# Patient Record
Sex: MALE | Race: WHITE | NOT HISPANIC OR LATINO | Employment: OTHER | URBAN - NONMETROPOLITAN AREA
[De-identification: names, ages, dates, MRNs, and addresses within clinical notes are randomized per-mention and may not be internally consistent; named-entity substitution may affect disease eponyms.]

---

## 2022-04-21 ENCOUNTER — HOSPITAL ENCOUNTER (EMERGENCY)
Facility: HOSPITAL | Age: 42
Discharge: HOME | End: 2022-04-21
Attending: EMERGENCY MEDICINE | Admitting: EMERGENCY MEDICINE

## 2022-04-21 VITALS
OXYGEN SATURATION: 100 % | HEIGHT: 68 IN | BODY MASS INDEX: 31.37 KG/M2 | SYSTOLIC BLOOD PRESSURE: 144 MMHG | RESPIRATION RATE: 16 BRPM | WEIGHT: 207 LBS | DIASTOLIC BLOOD PRESSURE: 66 MMHG | HEART RATE: 54 BPM | TEMPERATURE: 98.4 F

## 2022-04-21 DIAGNOSIS — R11.2 INTRACTABLE NAUSEA AND VOMITING: Primary | ICD-10-CM

## 2022-04-21 LAB
ALBUMIN SERPL-MCNC: 4.2 G/DL (ref 3.5–5.2)
ALBUMIN/GLOB SERPL: 1.4 G/DL
ALP SERPL-CCNC: 77 U/L (ref 39–117)
ALT SERPL W P-5'-P-CCNC: 14 U/L (ref 1–41)
AMYLASE SERPL-CCNC: 54 U/L (ref 28–100)
ANION GAP SERPL CALCULATED.3IONS-SCNC: 12 MMOL/L (ref 5–15)
AST SERPL-CCNC: 15 U/L (ref 1–40)
BACTERIA UR QL AUTO: ABNORMAL /HPF
BASOPHILS # BLD AUTO: 0.05 10*3/MM3 (ref 0–0.2)
BASOPHILS NFR BLD AUTO: 0.4 % (ref 0–1.5)
BILIRUB SERPL-MCNC: 0.5 MG/DL (ref 0–1.2)
BILIRUB UR QL STRIP: NEGATIVE
BUN SERPL-MCNC: 17 MG/DL (ref 6–20)
BUN/CREAT SERPL: 17.5 (ref 7–25)
CALCIUM SPEC-SCNC: 9.1 MG/DL (ref 8.6–10.5)
CHLORIDE SERPL-SCNC: 99 MMOL/L (ref 98–107)
CLARITY UR: CLEAR
CO2 SERPL-SCNC: 22 MMOL/L (ref 22–29)
COLOR UR: ABNORMAL
CREAT SERPL-MCNC: 0.97 MG/DL (ref 0.76–1.27)
DEPRECATED RDW RBC AUTO: 43.8 FL (ref 37–54)
EGFRCR SERPLBLD CKD-EPI 2021: 101.2 ML/MIN/1.73
EOSINOPHIL # BLD AUTO: 0.01 10*3/MM3 (ref 0–0.4)
EOSINOPHIL NFR BLD AUTO: 0.1 % (ref 0.3–6.2)
ERYTHROCYTE [DISTWIDTH] IN BLOOD BY AUTOMATED COUNT: 13.4 % (ref 12.3–15.4)
GLOBULIN UR ELPH-MCNC: 3 GM/DL
GLUCOSE SERPL-MCNC: 87 MG/DL (ref 65–99)
GLUCOSE UR STRIP-MCNC: NEGATIVE MG/DL
HCT VFR BLD AUTO: 37.3 % (ref 37.5–51)
HGB BLD-MCNC: 13.2 G/DL (ref 13–17.7)
HGB UR QL STRIP.AUTO: ABNORMAL
HOLD SPECIMEN: NORMAL
HYALINE CASTS UR QL AUTO: ABNORMAL /LPF
IMM GRANULOCYTES # BLD AUTO: 0.07 10*3/MM3 (ref 0–0.05)
IMM GRANULOCYTES NFR BLD AUTO: 0.6 % (ref 0–0.5)
KETONES UR QL STRIP: ABNORMAL
LEUKOCYTE ESTERASE UR QL STRIP.AUTO: ABNORMAL
LIPASE SERPL-CCNC: 19 U/L (ref 13–60)
LYMPHOCYTES # BLD AUTO: 3.03 10*3/MM3 (ref 0.7–3.1)
LYMPHOCYTES NFR BLD AUTO: 24.3 % (ref 19.6–45.3)
MCH RBC QN AUTO: 31.7 PG (ref 26.6–33)
MCHC RBC AUTO-ENTMCNC: 35.4 G/DL (ref 31.5–35.7)
MCV RBC AUTO: 89.4 FL (ref 79–97)
MONOCYTES # BLD AUTO: 0.75 10*3/MM3 (ref 0.1–0.9)
MONOCYTES NFR BLD AUTO: 6 % (ref 5–12)
NEUTROPHILS NFR BLD AUTO: 68.6 % (ref 42.7–76)
NEUTROPHILS NFR BLD AUTO: 8.58 10*3/MM3 (ref 1.7–7)
NITRITE UR QL STRIP: NEGATIVE
NRBC BLD AUTO-RTO: 0 /100 WBC (ref 0–0.2)
PH UR STRIP.AUTO: 6.5 [PH] (ref 5–9)
PLATELET # BLD AUTO: 330 10*3/MM3 (ref 140–450)
PMV BLD AUTO: 9.8 FL (ref 6–12)
POTASSIUM SERPL-SCNC: 3.7 MMOL/L (ref 3.5–5.2)
PROT SERPL-MCNC: 7.2 G/DL (ref 6–8.5)
PROT UR QL STRIP: ABNORMAL
RBC # BLD AUTO: 4.17 10*6/MM3 (ref 4.14–5.8)
RBC # UR STRIP: ABNORMAL /HPF
REF LAB TEST METHOD: ABNORMAL
SODIUM SERPL-SCNC: 133 MMOL/L (ref 136–145)
SP GR UR STRIP: 1.03 (ref 1–1.03)
SQUAMOUS #/AREA URNS HPF: ABNORMAL /HPF
UROBILINOGEN UR QL STRIP: ABNORMAL
WBC # UR STRIP: ABNORMAL /HPF
WBC NRBC COR # BLD: 12.49 10*3/MM3 (ref 3.4–10.8)

## 2022-04-21 PROCEDURE — 83690 ASSAY OF LIPASE: CPT | Performed by: NURSE PRACTITIONER

## 2022-04-21 PROCEDURE — 96374 THER/PROPH/DIAG INJ IV PUSH: CPT

## 2022-04-21 PROCEDURE — 81001 URINALYSIS AUTO W/SCOPE: CPT | Performed by: NURSE PRACTITIONER

## 2022-04-21 PROCEDURE — 85025 COMPLETE CBC W/AUTO DIFF WBC: CPT | Performed by: NURSE PRACTITIONER

## 2022-04-21 PROCEDURE — 25010000002 METOCLOPRAMIDE PER 10 MG: Performed by: NURSE PRACTITIONER

## 2022-04-21 PROCEDURE — 99283 EMERGENCY DEPT VISIT LOW MDM: CPT

## 2022-04-21 PROCEDURE — 82150 ASSAY OF AMYLASE: CPT | Performed by: NURSE PRACTITIONER

## 2022-04-21 PROCEDURE — 80053 COMPREHEN METABOLIC PANEL: CPT | Performed by: NURSE PRACTITIONER

## 2022-04-21 RX ORDER — SODIUM CHLORIDE 9 MG/ML
INJECTION, SOLUTION INTRAVENOUS
Status: DISCONTINUED
Start: 2022-04-21 | End: 2022-04-22 | Stop reason: HOSPADM

## 2022-04-21 RX ORDER — METOCLOPRAMIDE HYDROCHLORIDE 5 MG/ML
10 INJECTION INTRAMUSCULAR; INTRAVENOUS ONCE
Status: COMPLETED | OUTPATIENT
Start: 2022-04-21 | End: 2022-04-21

## 2022-04-21 RX ORDER — METOCLOPRAMIDE 10 MG/1
10 TABLET ORAL ONCE
Status: COMPLETED | OUTPATIENT
Start: 2022-04-21 | End: 2022-04-21

## 2022-04-21 RX ORDER — SODIUM CHLORIDE 0.9 % (FLUSH) 0.9 %
10 SYRINGE (ML) INJECTION AS NEEDED
Status: DISCONTINUED | OUTPATIENT
Start: 2022-04-21 | End: 2022-04-22 | Stop reason: HOSPADM

## 2022-04-21 RX ORDER — METOCLOPRAMIDE HYDROCHLORIDE 5 MG/5ML
10 SOLUTION ORAL ONCE
Status: DISCONTINUED | OUTPATIENT
Start: 2022-04-21 | End: 2022-04-21

## 2022-04-21 RX ORDER — METOCLOPRAMIDE 10 MG/1
10 TABLET ORAL 3 TIMES DAILY
Qty: 15 TABLET | Refills: 0 | Status: SHIPPED | OUTPATIENT
Start: 2022-04-21 | End: 2022-04-26

## 2022-04-21 RX ADMIN — METOCLOPRAMIDE 10 MG: 5 INJECTION, SOLUTION INTRAMUSCULAR; INTRAVENOUS at 20:38

## 2022-04-21 RX ADMIN — SODIUM CHLORIDE 1000 ML: 9 INJECTION, SOLUTION INTRAVENOUS at 19:00

## 2022-04-21 RX ADMIN — METOCLOPRAMIDE 10 MG: 10 TABLET ORAL at 22:19

## 2022-04-21 NOTE — ED NOTES
Pt reports he has abd pain and n/v for past week, has been to urgent care twice. Unable to keep food or liquid down.

## 2022-04-22 ENCOUNTER — OFFICE VISIT (OUTPATIENT)
Dept: GASTROENTEROLOGY | Facility: CLINIC | Age: 42
End: 2022-04-22

## 2022-04-22 VITALS
WEIGHT: 205.8 LBS | SYSTOLIC BLOOD PRESSURE: 128 MMHG | HEART RATE: 61 BPM | DIASTOLIC BLOOD PRESSURE: 79 MMHG | BODY MASS INDEX: 31.19 KG/M2 | HEIGHT: 68 IN

## 2022-04-22 DIAGNOSIS — R11.0 NAUSEA: ICD-10-CM

## 2022-04-22 DIAGNOSIS — R10.10 PAIN OF UPPER ABDOMEN: Primary | ICD-10-CM

## 2022-04-22 DIAGNOSIS — R10.32 LEFT LOWER QUADRANT ABDOMINAL PAIN: ICD-10-CM

## 2022-04-22 PROCEDURE — 99204 OFFICE O/P NEW MOD 45 MIN: CPT | Performed by: INTERNAL MEDICINE

## 2022-04-22 RX ORDER — ONDANSETRON 4 MG/1
4 TABLET, ORALLY DISINTEGRATING ORAL EVERY 8 HOURS PRN
COMMUNITY
Start: 2022-04-21

## 2022-04-22 RX ORDER — OMEPRAZOLE 20 MG/1
20 TABLET, DELAYED RELEASE ORAL 2 TIMES DAILY
Qty: 168 TABLET | Refills: 4 | Status: SHIPPED | OUTPATIENT
Start: 2022-04-22 | End: 2022-04-25

## 2022-04-22 RX ORDER — DEXTROSE AND SODIUM CHLORIDE 5; .45 G/100ML; G/100ML
30 INJECTION, SOLUTION INTRAVENOUS CONTINUOUS PRN
Status: CANCELLED | OUTPATIENT
Start: 2022-04-26

## 2022-04-22 RX ORDER — SODIUM PICOSULFATE, MAGNESIUM OXIDE, AND ANHYDROUS CITRIC ACID 10; 3.5; 12 MG/160ML; G/160ML; G/160ML
160 LIQUID ORAL SEE ADMIN INSTRUCTIONS
Qty: 320 ML | Refills: 0 | Status: SHIPPED | OUTPATIENT
Start: 2022-04-22 | End: 2022-04-25

## 2022-04-22 NOTE — ED PROVIDER NOTES
"Subjective   Patient in the emergency department complaining of abdominal pain nausea vomiting.  He reports onset 1 week ago.  Significant other at bedside.  Patient's been to another emergency department 3 times in urgent care twice for intractable nausea and vomiting.  CT last night at [facility] was read as normal.  Patient reports in the past 24 hours has had approximately 40 bouts of vomiting.  He reports about 2 weeks ago he stopped using alcohol, nicotine, marijuana, sodas, \"cold turkey\".      History provided by:  Patient   used: No        Review of Systems   Constitutional: Negative for diaphoresis.   HENT: Negative for congestion.    Respiratory: Negative for shortness of breath.    Cardiovascular: Negative for chest pain and palpitations.   Gastrointestinal: Positive for abdominal pain, nausea and vomiting. Negative for diarrhea.   Genitourinary: Negative for flank pain.   Musculoskeletal: Negative for gait problem.   Skin: Negative for wound.   Allergic/Immunologic: Negative for immunocompromised state.   Neurological: Negative for weakness.   Hematological: Negative for adenopathy.   Psychiatric/Behavioral: Negative for confusion.   All other systems reviewed and are negative.      History reviewed. No pertinent past medical history.    Allergies   Allergen Reactions   • Penicillins Seizure       History reviewed. No pertinent surgical history.    History reviewed. No pertinent family history.    Social History     Socioeconomic History   • Marital status: Single   Tobacco Use   • Smoking status: Former Smoker   • Smokeless tobacco: Never Used   Vaping Use   • Vaping Use: Former   Substance and Sexual Activity   • Drug use: Not Currently   • Sexual activity: Defer           Objective   Physical Exam  Vitals and nursing note reviewed.   Constitutional:       Appearance: He is well-developed.   HENT:      Head: Normocephalic.      Nose: Nose normal.   Eyes:      " Conjunctiva/sclera: Conjunctivae normal.      Pupils: Pupils are equal, round, and reactive to light.   Cardiovascular:      Rate and Rhythm: Normal rate and regular rhythm.      Heart sounds: Normal heart sounds.   Pulmonary:      Effort: Pulmonary effort is normal.      Breath sounds: Normal breath sounds.   Abdominal:      Palpations: Abdomen is soft.      Tenderness: There is generalized abdominal tenderness.   Musculoskeletal:         General: Normal range of motion.      Cervical back: Normal range of motion.   Skin:     General: Skin is warm and dry.   Neurological:      Mental Status: He is alert and oriented to person, place, and time.      GCS: GCS eye subscore is 4. GCS verbal subscore is 5. GCS motor subscore is 6.         Procedures           ED Course      Results for orders placed or performed during the hospital encounter of 04/21/22   Comprehensive Metabolic Panel    Specimen: Blood   Result Value Ref Range    Glucose 87 65 - 99 mg/dL    BUN 17 6 - 20 mg/dL    Creatinine 0.97 0.76 - 1.27 mg/dL    Sodium 133 (L) 136 - 145 mmol/L    Potassium 3.7 3.5 - 5.2 mmol/L    Chloride 99 98 - 107 mmol/L    CO2 22.0 22.0 - 29.0 mmol/L    Calcium 9.1 8.6 - 10.5 mg/dL    Total Protein 7.2 6.0 - 8.5 g/dL    Albumin 4.20 3.50 - 5.20 g/dL    ALT (SGPT) 14 1 - 41 U/L    AST (SGOT) 15 1 - 40 U/L    Alkaline Phosphatase 77 39 - 117 U/L    Total Bilirubin 0.5 0.0 - 1.2 mg/dL    Globulin 3.0 gm/dL    A/G Ratio 1.4 g/dL    BUN/Creatinine Ratio 17.5 7.0 - 25.0    Anion Gap 12.0 5.0 - 15.0 mmol/L    eGFR 101.2 >60.0 mL/min/1.73   Lipase    Specimen: Blood   Result Value Ref Range    Lipase 19 13 - 60 U/L   Amylase    Specimen: Blood   Result Value Ref Range    Amylase 54 28 - 100 U/L   Urinalysis With Microscopic If Indicated (No Culture) - Urine, Clean Catch    Specimen: Urine, Clean Catch   Result Value Ref Range    Color, UA Dark Yellow Yellow, Straw, Dark Yellow, Anila    Appearance, UA Clear Clear    pH, UA 6.5 5.0 -  9.0    Specific Gravity, UA 1.030 1.003 - 1.030    Glucose, UA Negative Negative    Ketones, UA >=160 mg/dL (4+) (A) Negative    Bilirubin, UA Negative Negative    Blood, UA Trace (A) Negative    Protein, UA 30 mg/dL (1+) (A) Negative    Leuk Esterase, UA Trace (A) Negative    Nitrite, UA Negative Negative    Urobilinogen, UA 2.0 E.U./dL (A) 0.2 - 1.0 E.U./dL   CBC Auto Differential    Specimen: Blood   Result Value Ref Range    WBC 12.49 (H) 3.40 - 10.80 10*3/mm3    RBC 4.17 4.14 - 5.80 10*6/mm3    Hemoglobin 13.2 13.0 - 17.7 g/dL    Hematocrit 37.3 (L) 37.5 - 51.0 %    MCV 89.4 79.0 - 97.0 fL    MCH 31.7 26.6 - 33.0 pg    MCHC 35.4 31.5 - 35.7 g/dL    RDW 13.4 12.3 - 15.4 %    RDW-SD 43.8 37.0 - 54.0 fl    MPV 9.8 6.0 - 12.0 fL    Platelets 330 140 - 450 10*3/mm3    Neutrophil % 68.6 42.7 - 76.0 %    Lymphocyte % 24.3 19.6 - 45.3 %    Monocyte % 6.0 5.0 - 12.0 %    Eosinophil % 0.1 (L) 0.3 - 6.2 %    Basophil % 0.4 0.0 - 1.5 %    Immature Grans % 0.6 (H) 0.0 - 0.5 %    Neutrophils, Absolute 8.58 (H) 1.70 - 7.00 10*3/mm3    Lymphocytes, Absolute 3.03 0.70 - 3.10 10*3/mm3    Monocytes, Absolute 0.75 0.10 - 0.90 10*3/mm3    Eosinophils, Absolute 0.01 0.00 - 0.40 10*3/mm3    Basophils, Absolute 0.05 0.00 - 0.20 10*3/mm3    Immature Grans, Absolute 0.07 (H) 0.00 - 0.05 10*3/mm3    nRBC 0.0 0.0 - 0.2 /100 WBC   Urinalysis, Microscopic Only - Urine, Clean Catch    Specimen: Urine, Clean Catch   Result Value Ref Range    RBC, UA 3-5 (A) None Seen /HPF    WBC, UA 0-2 None Seen, 0-2, 3-5 /HPF    Bacteria, UA None Seen None Seen /HPF    Squamous Epithelial Cells, UA 0-2 None Seen, 0-2 /HPF    Hyaline Casts, UA 0-2 None Seen /LPF    Methodology Automated Microscopy    Gold Top - SST   Result Value Ref Range    Extra Tube Hold for add-ons.                                                 MDM    Final diagnoses:   Intractable nausea and vomiting       ED Disposition  ED Disposition     ED Disposition   Discharge    Condition    Stable    Comment   --             Holly Girard MD  [address] [place] [address] [address]  [place] [address] [phone]    Call in 1 day           Medication List      New Prescriptions    metoclopramide 10 MG tablet  Commonly known as: REGLAN  Take 1 tablet by mouth 3 (Three) Times a Day for 5 days.           Where to Get Your Medications      These medications were sent to [place] [id number] - [phone]  - [phone] [address] [address]    Phone: [phone]   · metoclopramide 10 MG tablet          Faisal Fonseca, APRN  04/21/22 2124

## 2022-04-25 ENCOUNTER — LAB (OUTPATIENT)
Dept: LAB | Facility: HOSPITAL | Age: 42
End: 2022-04-25

## 2022-04-25 DIAGNOSIS — R11.0 NAUSEA: ICD-10-CM

## 2022-04-25 DIAGNOSIS — R10.10 PAIN OF UPPER ABDOMEN: ICD-10-CM

## 2022-04-25 DIAGNOSIS — R10.32 LEFT LOWER QUADRANT ABDOMINAL PAIN: ICD-10-CM

## 2022-04-25 LAB — SARS-COV-2 N GENE RESP QL NAA+PROBE: NOT DETECTED

## 2022-04-25 PROCEDURE — C9803 HOPD COVID-19 SPEC COLLECT: HCPCS

## 2022-04-25 PROCEDURE — 87635 SARS-COV-2 COVID-19 AMP PRB: CPT

## 2022-04-26 ENCOUNTER — ANESTHESIA (OUTPATIENT)
Dept: GASTROENTEROLOGY | Facility: HOSPITAL | Age: 42
End: 2022-04-26

## 2022-04-26 ENCOUNTER — ANESTHESIA EVENT (OUTPATIENT)
Dept: GASTROENTEROLOGY | Facility: HOSPITAL | Age: 42
End: 2022-04-26

## 2022-04-26 ENCOUNTER — HOSPITAL ENCOUNTER (OUTPATIENT)
Facility: HOSPITAL | Age: 42
Setting detail: HOSPITAL OUTPATIENT SURGERY
Discharge: HOME | End: 2022-04-26
Attending: INTERNAL MEDICINE | Admitting: INTERNAL MEDICINE

## 2022-04-26 VITALS
SYSTOLIC BLOOD PRESSURE: 110 MMHG | OXYGEN SATURATION: 96 % | BODY MASS INDEX: 31.13 KG/M2 | HEIGHT: 68 IN | WEIGHT: 205.4 LBS | DIASTOLIC BLOOD PRESSURE: 64 MMHG | RESPIRATION RATE: 18 BRPM | TEMPERATURE: 97.1 F | HEART RATE: 72 BPM

## 2022-04-26 DIAGNOSIS — R10.32 LEFT LOWER QUADRANT ABDOMINAL PAIN: ICD-10-CM

## 2022-04-26 DIAGNOSIS — R10.10 PAIN OF UPPER ABDOMEN: ICD-10-CM

## 2022-04-26 DIAGNOSIS — R11.0 NAUSEA: ICD-10-CM

## 2022-04-26 PROCEDURE — 43239 EGD BIOPSY SINGLE/MULTIPLE: CPT | Performed by: INTERNAL MEDICINE

## 2022-04-26 PROCEDURE — 25010000002 PROPOFOL 10 MG/ML EMULSION: Performed by: NURSE ANESTHETIST, CERTIFIED REGISTERED

## 2022-04-26 RX ORDER — ONDANSETRON 2 MG/ML
4 INJECTION INTRAMUSCULAR; INTRAVENOUS ONCE AS NEEDED
Status: DISCONTINUED | OUTPATIENT
Start: 2022-04-26 | End: 2022-04-26 | Stop reason: HOSPADM

## 2022-04-26 RX ORDER — DEXTROSE AND SODIUM CHLORIDE 5; .45 G/100ML; G/100ML
30 INJECTION, SOLUTION INTRAVENOUS CONTINUOUS PRN
Status: DISCONTINUED | OUTPATIENT
Start: 2022-04-26 | End: 2022-04-26 | Stop reason: HOSPADM

## 2022-04-26 RX ORDER — PROPOFOL 10 MG/ML
VIAL (ML) INTRAVENOUS AS NEEDED
Status: DISCONTINUED | OUTPATIENT
Start: 2022-04-26 | End: 2022-04-26 | Stop reason: SURG

## 2022-04-26 RX ORDER — LIDOCAINE HYDROCHLORIDE 20 MG/ML
INJECTION, SOLUTION EPIDURAL; INFILTRATION; INTRACAUDAL; PERINEURAL AS NEEDED
Status: DISCONTINUED | OUTPATIENT
Start: 2022-04-26 | End: 2022-04-26 | Stop reason: SURG

## 2022-04-26 RX ADMIN — PROPOFOL 50 MG: 10 INJECTION, EMULSION INTRAVENOUS at 13:43

## 2022-04-26 RX ADMIN — PROPOFOL 30 MG: 10 INJECTION, EMULSION INTRAVENOUS at 13:45

## 2022-04-26 RX ADMIN — DEXTROSE AND SODIUM CHLORIDE 30 ML/HR: 5; 450 INJECTION, SOLUTION INTRAVENOUS at 13:17

## 2022-04-26 RX ADMIN — PROPOFOL 120 MG: 10 INJECTION, EMULSION INTRAVENOUS at 13:41

## 2022-04-26 RX ADMIN — LIDOCAINE HYDROCHLORIDE 100 MG: 20 INJECTION, SOLUTION EPIDURAL; INFILTRATION; INTRACAUDAL; PERINEURAL at 13:41

## 2022-04-26 NOTE — ANESTHESIA PREPROCEDURE EVALUATION
Anesthesia Evaluation     Patient summary reviewed and Nursing notes reviewed   no history of anesthetic complications:  NPO Solid Status: > 8 hours  NPO Liquid Status: > 8 hours           Airway   Mallampati: II  TM distance: >3 FB  Neck ROM: full  No difficulty expected  Dental    (+) poor dentition        Pulmonary    (+) a smoker Current,   Cardiovascular - negative cardio ROS        Neuro/Psych- negative ROS  GI/Hepatic/Renal/Endo    (+) obesity,       Musculoskeletal (-) negative ROS    Abdominal    Substance History   (+) drug use (THC)     OB/GYN          Other - negative ROS                       Anesthesia Plan    ASA 2     MAC     intravenous induction     Anesthetic plan, all risks, benefits, and alternatives have been provided, discussed and informed consent has been obtained with: patient.        CODE STATUS:

## 2022-04-26 NOTE — ANESTHESIA POSTPROCEDURE EVALUATION
Patient: Aron Grier    Procedure Summary     Date: 04/26/22 Room / Location: [facility] ENDOSCOPY 3 / [facility] ENDOSCOPY    Anesthesia Start: 1335 Anesthesia Stop: 1348    Procedure: ESOPHAGOGASTRODUODENOSCOPY (N/A ) Diagnosis:       Pain of upper abdomen      Left lower quadrant abdominal pain      Nausea      (Pain of upper abdomen [R10.10])      (Left lower quadrant abdominal pain [R10.32])      (Nausea [R11.0])    Surgeons: Holly Girard MD Provider: Olinda Davis CRNA    Anesthesia Type: MAC ASA Status: 2          Anesthesia Type: MAC    Vitals  No vitals data found for the desired time range.          Post Anesthesia Care and Evaluation    Patient location during evaluation: PHASE II  Patient participation: complete - patient participated  Level of consciousness: sleepy but conscious  Pain score: 0  Pain management: adequate  Airway patency: patent  Anesthetic complications: No anesthetic complications  PONV Status: none  Cardiovascular status: acceptable and hemodynamically stable  Respiratory status: acceptable  Hydration status: acceptable

## 2022-04-29 LAB
LAB AP CASE REPORT: NORMAL
PATH REPORT.FINAL DX SPEC: NORMAL

## 2022-05-06 ENCOUNTER — OFFICE VISIT (OUTPATIENT)
Dept: GASTROENTEROLOGY | Facility: CLINIC | Age: 42
End: 2022-05-06

## 2022-05-06 VITALS
SYSTOLIC BLOOD PRESSURE: 144 MMHG | HEART RATE: 75 BPM | DIASTOLIC BLOOD PRESSURE: 90 MMHG | BODY MASS INDEX: 31.64 KG/M2 | HEIGHT: 68 IN | WEIGHT: 208.8 LBS

## 2022-05-06 DIAGNOSIS — R10.10 PAIN OF UPPER ABDOMEN: Primary | ICD-10-CM

## 2022-05-06 PROCEDURE — 99213 OFFICE O/P EST LOW 20 MIN: CPT | Performed by: INTERNAL MEDICINE

## 2022-05-06 RX ORDER — OMEPRAZOLE 40 MG/1
40 CAPSULE, DELAYED RELEASE ORAL DAILY
Qty: 30 CAPSULE | Refills: 11 | Status: SHIPPED | OUTPATIENT
Start: 2022-05-06

## 2022-05-06 NOTE — PROGRESS NOTES
Chief Complaint   Patient presents with   • endo f/u       Subjective    Aron Grier is a 42 y.o. male.    History of Present Illness  Patient presented to GI clinic for follow-up visit today.  Feels better currently.  No further abdominal pain or nausea.  He has intermittent cough.  Denied heartburn.  Bowel movements regular.  Weight is stable.  EGD was consistent with hiatal hernia, esophagitis, gastritis and duodenitis.  Path was consistent with chronic esophagitis and reactive gastropathy.       The following portions of the patient's history were reviewed and updated as appropriate:   History reviewed. No pertinent past medical history.  Past Surgical History:   Procedure Laterality Date   • ENDOSCOPY  04/26/2022    Dr. Girard   • TENDON REPAIR      left ring finger.   done under local     Family History   Problem Relation Age of Onset   • Heart disease Mother    • Kidney disease Mother        Prior to Admission medications    Medication Sig Start Date End Date Taking? Authorizing Provider   omeprazole (priLOSEC) 40 MG capsule Take 1 capsule by mouth Daily. 5/6/22   Holly Girard MD   ondansetron ODT (ZOFRAN-ODT) 4 MG disintegrating tablet Place 4 mg under the tongue Every 8 (Eight) Hours As Needed. 4/21/22   ProviderArun MD     Allergies   Allergen Reactions   • Penicillins Seizure     Social History     Socioeconomic History   • Marital status: Legally    Tobacco Use   • Smoking status: Former Smoker   • Smokeless tobacco: Never Used   Vaping Use   • Vaping Use: Former   Substance and Sexual Activity   • Alcohol use: Yes     Comment: socially   • Drug use: Yes     Types: Marijuana   • Sexual activity: Defer       Review of Systems  Review of Systems   Constitutional: Negative for chills, fatigue, fever and unexpected weight change.   HENT: Negative for congestion, ear discharge, hearing loss, nosebleeds and sore throat.    Eyes: Negative for pain, discharge and redness.  "  Respiratory: Positive for cough. Negative for chest tightness, shortness of breath and wheezing.    Cardiovascular: Negative for chest pain and palpitations.   Gastrointestinal: Negative for abdominal distention, abdominal pain, blood in stool, constipation, diarrhea, nausea and vomiting.   Endocrine: Negative for cold intolerance, polydipsia, polyphagia and polyuria.   Genitourinary: Negative for dysuria, flank pain, frequency, hematuria and urgency.   Musculoskeletal: Negative for arthralgias, back pain, joint swelling and myalgias.   Skin: Negative for color change, pallor and rash.   Neurological: Negative for tremors, seizures, syncope, weakness and headaches.   Hematological: Negative for adenopathy. Does not bruise/bleed easily.   Psychiatric/Behavioral: Negative for behavioral problems, confusion, dysphoric mood, hallucinations and suicidal ideas. The patient is not nervous/anxious.         /90 (BP Location: Right arm)   Pulse 75   Ht 172.7 cm (68\")   Wt 94.7 kg (208 lb 12.8 oz)   BMI 31.75 kg/m²     Objective    Physical Exam  Constitutional:       Appearance: He is well-developed.   HENT:      Head: Normocephalic and atraumatic.   Eyes:      Conjunctiva/sclera: Conjunctivae normal.      Pupils: Pupils are equal, round, and reactive to light.   Neck:      Thyroid: No thyromegaly.   Cardiovascular:      Rate and Rhythm: Normal rate and regular rhythm.      Heart sounds: Normal heart sounds. No murmur heard.  Pulmonary:      Effort: Pulmonary effort is normal.      Breath sounds: Normal breath sounds. No wheezing.   Abdominal:      General: Bowel sounds are normal. There is no distension.      Palpations: Abdomen is soft. There is no mass.      Tenderness: There is no abdominal tenderness.      Hernia: No hernia is present.   Genitourinary:     Comments: No lesions noted  Musculoskeletal:         General: No tenderness. Normal range of motion.      Cervical back: Normal range of motion and neck " supple.   Lymphadenopathy:      Cervical: No cervical adenopathy.   Skin:     General: Skin is warm and dry.      Findings: No rash.   Neurological:      Mental Status: He is alert and oriented to person, place, and time.      Cranial Nerves: No cranial nerve deficit.   Psychiatric:         Thought Content: Thought content normal.       Admission on 04/26/2022, Discharged on 04/26/2022   Component Date Value Ref Range Status   • Case Report 04/26/2022    Final                    Value:Surgical Pathology Report                         Case: [id number]                                  Authorizing Provider:  Holly Girard MD      Collected:           04/26/2022 01:46 PM          Ordering Location:     [facility]   Received:            04/27/2022 07:41 AM                                 [facility]                                                     Pathologist:           Shashi Asencio MD                                                           Specimens:   1) - Gastric, Antrum                                                                                2) - Small Intestine, Duodenum                                                                      3) - Esophagus, EGJ                                                                       • Final Diagnosis 04/26/2022    Final                    Value:This result contains rich text formatting which cannot be displayed here.     Assessment/Plan    No diagnosis found..   1.  Abdominal pain and nausea, improved.  Likely due to reactive gastropathy.  Continue Prilosec.  Add Carafate if recurs.  2.  Cough, likely due to GERD.  Continue PPI.  Add Pepcid if continues.  Add antireflux lifestyle.  3.  Obesity, recommend exercise and diet control.  4.  Marijuana usage, recommend cessation.    Orders placed during this encounter include:  No orders of the defined types were placed in this encounter.      * Surgery not found *    Review and/or  summary of lab tests, radiology, procedures, medications. Review and summary of old records and obtaining of history. The risks and benefits of my recommendations, as well as other treatment options were discussed with the patient today. Questions were answered.    New Medications Ordered This Visit   Medications   • omeprazole (priLOSEC) 40 MG capsule     Sig: Take 1 capsule by mouth Daily.     Dispense:  30 capsule     Refill:  11       Follow-up: Return in about 3 months (around 8/6/2022).               Results for orders placed or performed during the hospital encounter of 04/26/22   Tissue Pathology Exam    Specimen: A: Gastric, Antrum; Tissue    B: Small Intestine, Duodenum; Tissue    C: Esophagus; Tissue   Result Value Ref Range    Case Report       Surgical Pathology Report                         Case: [id number]                                  Authorizing Provider:  Holly Girard MD      Collected:           04/26/2022 01:46 PM          Ordering Location:     [facility]   Received:            04/27/2022 07:41 AM                                 [facility]                                                     Pathologist:           Shashi Asencio MD                                                           Specimens:   1) - Gastric, Antrum                                                                                2) - Small Intestine, Duodenum                                                                      3) - Esophagus, EGJ                                                                        Final Diagnosis       SEE SCANNED REPORT       Results for orders placed or performed in visit on 04/25/22   COVID-19, [facility] IN-HOUSE, NP SWAB IN TRANSPORT MEDIA 8-10 HR TAT - Swab, Nasopharynx    Specimen: Nasopharynx; Swab   Result Value Ref Range    COVID19 Not Detected Not Detected - Ref. Range   Results for orders placed or performed during the hospital encounter of  04/21/22   [facility]   Result Value Ref Range    Extra Tube Hold for add-ons.    Urinalysis, Microscopic Only - Urine, Clean Catch    Specimen: Urine, Clean Catch   Result Value Ref Range    RBC, UA 3-5 (A) None Seen /HPF    WBC, UA 0-2 None Seen, 0-2, 3-5 /HPF    Bacteria, UA None Seen None Seen /HPF    Squamous Epithelial Cells, UA 0-2 None Seen, 0-2 /HPF    Hyaline Casts, UA 0-2 None Seen /LPF    Methodology Automated Microscopy    Urinalysis With Microscopic If Indicated (No Culture) - Urine, Clean Catch    Specimen: Urine, Clean Catch   Result Value Ref Range    Color, UA Dark Yellow Yellow, Straw, Dark Yellow, Anila    Appearance, UA Clear Clear    pH, UA 6.5 5.0 - 9.0    Specific Gravity, UA 1.030 1.003 - 1.030    Glucose, UA Negative Negative    Ketones, UA >=160 mg/dL (4+) (A) Negative    Bilirubin, UA Negative Negative    Blood, UA Trace (A) Negative    Protein, UA 30 mg/dL (1+) (A) Negative    Leuk Esterase, UA Trace (A) Negative    Nitrite, UA Negative Negative    Urobilinogen, UA 2.0 E.U./dL (A) 0.2 - 1.0 E.U./dL   CBC Auto Differential    Specimen: Blood   Result Value Ref Range    WBC 12.49 (H) 3.40 - 10.80 10*3/mm3    RBC 4.17 4.14 - 5.80 10*6/mm3    Hemoglobin 13.2 13.0 - 17.7 g/dL    Hematocrit 37.3 (L) 37.5 - 51.0 %    MCV 89.4 79.0 - 97.0 fL    MCH 31.7 26.6 - 33.0 pg    MCHC 35.4 31.5 - 35.7 g/dL    RDW 13.4 12.3 - 15.4 %    RDW-SD 43.8 37.0 - 54.0 fl    MPV 9.8 6.0 - 12.0 fL    Platelets 330 140 - 450 10*3/mm3    Neutrophil % 68.6 42.7 - 76.0 %    Lymphocyte % 24.3 19.6 - 45.3 %    Monocyte % 6.0 5.0 - 12.0 %    Eosinophil % 0.1 (L) 0.3 - 6.2 %    Basophil % 0.4 0.0 - 1.5 %    Immature Grans % 0.6 (H) 0.0 - 0.5 %    Neutrophils, Absolute 8.58 (H) 1.70 - 7.00 10*3/mm3    Lymphocytes, Absolute 3.03 0.70 - 3.10 10*3/mm3    Monocytes, Absolute 0.75 0.10 - 0.90 10*3/mm3    Eosinophils, Absolute 0.01 0.00 - 0.40 10*3/mm3    Basophils, Absolute 0.05 0.00 - 0.20 10*3/mm3    Immature Grans,  Absolute 0.07 (H) 0.00 - 0.05 10*3/mm3    nRBC 0.0 0.0 - 0.2 /100 WBC   Lipase    Specimen: Blood   Result Value Ref Range    Lipase 19 13 - 60 U/L   Amylase    Specimen: Blood   Result Value Ref Range    Amylase 54 28 - 100 U/L   Comprehensive Metabolic Panel    Specimen: Blood   Result Value Ref Range    Glucose 87 65 - 99 mg/dL    BUN 17 6 - 20 mg/dL    Creatinine 0.97 0.76 - 1.27 mg/dL    Sodium 133 (L) 136 - 145 mmol/L    Potassium 3.7 3.5 - 5.2 mmol/L    Chloride 99 98 - 107 mmol/L    CO2 22.0 22.0 - 29.0 mmol/L    Calcium 9.1 8.6 - 10.5 mg/dL    Total Protein 7.2 6.0 - 8.5 g/dL    Albumin 4.20 3.50 - 5.20 g/dL    ALT (SGPT) 14 1 - 41 U/L    AST (SGOT) 15 1 - 40 U/L    Alkaline Phosphatase 77 39 - 117 U/L    Total Bilirubin 0.5 0.0 - 1.2 mg/dL    Globulin 3.0 gm/dL    A/G Ratio 1.4 g/dL    BUN/Creatinine Ratio 17.5 7.0 - 25.0    Anion Gap 12.0 5.0 - 15.0 mmol/L    eGFR 101.2 >60.0 mL/min/1.73   Results for orders placed or performed in visit on 10/30/02   Converted Surgical Pathology    Specimen: Tissue   Result Value Ref Range    Spec Descr 1 SPECIMEN(S): A LESION     Gross Description         GROSS DESCRIPTION:  The specimen consists of a tan skin fragment measuring 0.2 cm in  diameter each.  Totally submitted.  A fragment of clear suture is also  present in the container.  This has some brown fibrin or tissue  attached.      Final Diagnosis         FINAL DIAGNOSIS:  SUBCUTIS, LEFT THIRD FINGER:            FOREIGN BODY GRANULOMA CONTAINING POLARIZABLE FOREIGN                 MATERIAL.           pgl    DIAGNOSIS CODE:  3      Comment   CLINICAL DIAGNOSIS:  Lesion, left 3rd finger       CONVERTED (HISTORICAL) FINAL PATHOLOGIST       Diagnostician:  KADI MATTSON M.D.  Pathologist  Electronically Signed 11/01/2002           This document has been electronically signed by Holly Girard MD on May 6, 2022 12:03 CDT

## 2022-05-17 ENCOUNTER — OFFICE VISIT (OUTPATIENT)
Dept: GASTROENTEROLOGY | Facility: CLINIC | Age: 42
End: 2022-05-17

## 2022-05-17 VITALS
SYSTOLIC BLOOD PRESSURE: 149 MMHG | DIASTOLIC BLOOD PRESSURE: 97 MMHG | HEART RATE: 66 BPM | WEIGHT: 208.2 LBS | BODY MASS INDEX: 31.55 KG/M2 | HEIGHT: 68 IN

## 2022-05-17 DIAGNOSIS — K62.5 HEMORRHAGE OF ANUS AND RECTUM: Primary | ICD-10-CM

## 2022-05-17 PROCEDURE — 99213 OFFICE O/P EST LOW 20 MIN: CPT | Performed by: INTERNAL MEDICINE

## 2022-05-17 RX ORDER — HYDROCORTISONE ACETATE 25 MG/1
25 SUPPOSITORY RECTAL 2 TIMES DAILY PRN
Qty: 30 SUPPOSITORY | Refills: 0 | Status: SHIPPED | OUTPATIENT
Start: 2022-05-17 | End: 2022-05-24

## 2022-05-17 RX ORDER — DEXTROSE AND SODIUM CHLORIDE 5; .45 G/100ML; G/100ML
30 INJECTION, SOLUTION INTRAVENOUS CONTINUOUS PRN
Status: CANCELLED | OUTPATIENT
Start: 2022-05-26

## 2022-05-26 ENCOUNTER — HOSPITAL ENCOUNTER (OUTPATIENT)
Facility: HOSPITAL | Age: 42
Setting detail: HOSPITAL OUTPATIENT SURGERY
Discharge: HOME | End: 2022-05-26
Attending: INTERNAL MEDICINE | Admitting: INTERNAL MEDICINE

## 2022-05-26 ENCOUNTER — ANESTHESIA EVENT (OUTPATIENT)
Dept: GASTROENTEROLOGY | Facility: HOSPITAL | Age: 42
End: 2022-05-26

## 2022-05-26 ENCOUNTER — ANESTHESIA (OUTPATIENT)
Dept: GASTROENTEROLOGY | Facility: HOSPITAL | Age: 42
End: 2022-05-26

## 2022-05-26 VITALS
BODY MASS INDEX: 31.22 KG/M2 | RESPIRATION RATE: 18 BRPM | SYSTOLIC BLOOD PRESSURE: 111 MMHG | HEART RATE: 67 BPM | WEIGHT: 206 LBS | DIASTOLIC BLOOD PRESSURE: 69 MMHG | OXYGEN SATURATION: 98 % | TEMPERATURE: 97.3 F | HEIGHT: 68 IN

## 2022-05-26 DIAGNOSIS — K62.5 HEMORRHAGE OF ANUS AND RECTUM: ICD-10-CM

## 2022-05-26 PROCEDURE — 25010000002 PROPOFOL 10 MG/ML EMULSION

## 2022-05-26 PROCEDURE — 45385 COLONOSCOPY W/LESION REMOVAL: CPT | Performed by: INTERNAL MEDICINE

## 2022-05-26 RX ORDER — DEXTROSE AND SODIUM CHLORIDE 5; .45 G/100ML; G/100ML
30 INJECTION, SOLUTION INTRAVENOUS CONTINUOUS PRN
Status: DISCONTINUED | OUTPATIENT
Start: 2022-05-26 | End: 2022-05-26 | Stop reason: HOSPADM

## 2022-05-26 RX ORDER — PROPOFOL 10 MG/ML
VIAL (ML) INTRAVENOUS AS NEEDED
Status: DISCONTINUED | OUTPATIENT
Start: 2022-05-26 | End: 2022-05-26 | Stop reason: SURG

## 2022-05-26 RX ADMIN — PROPOFOL 20 MG: 10 INJECTION, EMULSION INTRAVENOUS at 16:43

## 2022-05-26 RX ADMIN — PROPOFOL 20 MG: 10 INJECTION, EMULSION INTRAVENOUS at 16:40

## 2022-05-26 RX ADMIN — PROPOFOL 100 MG: 10 INJECTION, EMULSION INTRAVENOUS at 16:35

## 2022-05-26 RX ADMIN — DEXTROSE AND SODIUM CHLORIDE 30 ML/HR: 5; 450 INJECTION, SOLUTION INTRAVENOUS at 15:22

## 2022-05-26 RX ADMIN — PROPOFOL 40 MG: 10 INJECTION, EMULSION INTRAVENOUS at 16:38

## 2022-05-26 RX ADMIN — PROPOFOL 20 MG: 10 INJECTION, EMULSION INTRAVENOUS at 16:46

## 2022-05-26 RX ADMIN — PROPOFOL 40 MG: 10 INJECTION, EMULSION INTRAVENOUS at 16:37

## 2022-05-26 RX ADMIN — PROPOFOL 40 MG: 10 INJECTION, EMULSION INTRAVENOUS at 16:36

## 2022-05-26 RX ADMIN — PROPOFOL 40 MG: 10 INJECTION, EMULSION INTRAVENOUS at 16:39

## 2022-05-26 NOTE — ANESTHESIA PREPROCEDURE EVALUATION
Anesthesia Evaluation     Patient summary reviewed and Nursing notes reviewed   no history of anesthetic complications:  NPO Solid Status: > 8 hours  NPO Liquid Status: > 8 hours           Airway   Mallampati: II  TM distance: >3 FB  Neck ROM: full  No difficulty expected  Dental    (+) poor dentition        Pulmonary    (+) a smoker Current,   Cardiovascular - negative cardio ROS        Neuro/Psych- negative ROS  GI/Hepatic/Renal/Endo    (+) obesity,  GI bleeding lower resolved,     Musculoskeletal (-) negative ROS    Abdominal    Substance History   (+) drug use (THC)     OB/GYN          Other - negative ROS                       Anesthesia Plan    ASA 2     MAC     intravenous induction     Anesthetic plan, all risks, benefits, and alternatives have been provided, discussed and informed consent has been obtained with: patient.        CODE STATUS:

## 2022-05-26 NOTE — ANESTHESIA POSTPROCEDURE EVALUATION
Patient: Aron Grier    Procedure Summary     Date: 05/26/22 Room / Location: [facility] ENDOSCOPY 1 / [facility] ENDOSCOPY    Anesthesia Start: 1630 Anesthesia Stop: 1652    Procedure: COLONOSCOPY (N/A ) Diagnosis:       Hemorrhage of anus and rectum      (Hemorrhage of anus and rectum [K62.5])    Surgeons: Holly Girard MD Provider: Eulalia Starks CRNA    Anesthesia Type: MAC ASA Status: 2          Anesthesia Type: MAC    Vitals  No vitals data found for the desired time range.          Post Anesthesia Care and Evaluation    Patient location during evaluation: bedside  Patient participation: complete - patient participated  Level of consciousness: awake and alert  Pain management: adequate  Airway patency: patent  Anesthetic complications: No anesthetic complications  PONV Status: none  Cardiovascular status: acceptable  Respiratory status: acceptable  Hydration status: acceptable    Comments: ---------------------------               05/26/22                      1510         ---------------------------   BP:          156/99         Pulse:         68           Resp:          18           Temp:   97.5 °F (36.4 °C)   SpO2:         100%         ---------------------------

## 2022-05-28 NOTE — PROGRESS NOTES
Chief Complaint   Patient presents with   • Black or Bloody Stool     Has a foul smell       Subjective    Aron Grier is a 42 y.o. male.    History of Present Illness  Patient presented to clinic for follow-up visit today.  Feels some better currently.  Abdominal pain and nausea are improving.  Intermittent symptoms with better blood and mucus in stools for past week.  Using Preparation H suppositories.  Denies diarrhea, constipation, melena or weight loss.  EGD was consistent with hiatal hernia, esophagitis, gastritis and duodenitis.  Path was consistent with reactive gastropathy and esophagitis.       The following portions of the patient's history were reviewed and updated as appropriate:   History reviewed. No pertinent past medical history.  Past Surgical History:   Procedure Laterality Date   • ENDOSCOPY  04/26/2022    Dr. Girard   • ENDOSCOPY N/A 4/26/2022    Procedure: ESOPHAGOGASTRODUODENOSCOPY;  Surgeon: Holly Girard MD;  Location: [facility] ENDOSCOPY;  Service: Gastroenterology;  Laterality: N/A;   • TENDON REPAIR      left ring finger.   done under local     Family History   Problem Relation Age of Onset   • Heart disease Mother    • Kidney disease Mother        Prior to Admission medications    Medication Sig Start Date End Date Taking? Authorizing Provider   omeprazole (priLOSEC) 40 MG capsule Take 1 capsule by mouth Daily. 5/6/22  Yes Holly Girard MD   ondansetron ODT (ZOFRAN-ODT) 4 MG disintegrating tablet Place 4 mg under the tongue Every 8 (Eight) Hours As Needed. 4/21/22  Yes ProviderArun MD   polyethylene glycol (GoLYTELY) 236 g solution Please use the instructions given in office 5/17/22   Holly Girard MD     Allergies   Allergen Reactions   • Penicillins Seizure     Social History     Socioeconomic History   • Marital status: Legally    Tobacco Use   • Smoking status: Current Every Day Smoker     Packs/day: 0.50   • Smokeless tobacco: Never Used  "  Vaping Use   • Vaping Use: Former   Substance and Sexual Activity   • Alcohol use: Yes     Comment: socially   • Drug use: Yes     Types: Marijuana     Comment: Quit in 4/2022   • Sexual activity: Defer       Review of Systems  Review of Systems   Constitutional: Negative for chills, fatigue, fever and unexpected weight change.   HENT: Negative for congestion, ear discharge, hearing loss, nosebleeds and sore throat.    Eyes: Negative for pain, discharge and redness.   Respiratory: Negative for cough, chest tightness, shortness of breath and wheezing.    Cardiovascular: Negative for chest pain and palpitations.   Gastrointestinal: Positive for abdominal pain, anal bleeding, blood in stool and nausea. Negative for abdominal distention, constipation, diarrhea and vomiting.   Endocrine: Negative for cold intolerance, polydipsia, polyphagia and polyuria.   Genitourinary: Negative for dysuria, flank pain, frequency, hematuria and urgency.   Musculoskeletal: Negative for arthralgias, back pain, joint swelling and myalgias.   Skin: Negative for color change, pallor and rash.   Neurological: Negative for tremors, seizures, syncope, weakness and headaches.   Hematological: Negative for adenopathy. Does not bruise/bleed easily.   Psychiatric/Behavioral: Negative for behavioral problems, confusion, dysphoric mood, hallucinations and suicidal ideas. The patient is not nervous/anxious.         /97 (BP Location: Right arm)   Pulse 66   Ht 172.7 cm (68\")   Wt 94.4 kg (208 lb 3.2 oz)   BMI 31.66 kg/m²     Objective    Physical Exam  Constitutional:       Appearance: He is well-developed.   HENT:      Head: Normocephalic and atraumatic.   Eyes:      Conjunctiva/sclera: Conjunctivae normal.      Pupils: Pupils are equal, round, and reactive to light.   Neck:      Thyroid: No thyromegaly.   Cardiovascular:      Rate and Rhythm: Normal rate and regular rhythm.      Heart sounds: Normal heart sounds. No murmur " heard.  Pulmonary:      Effort: Pulmonary effort is normal.      Breath sounds: Normal breath sounds. No wheezing.   Abdominal:      General: Bowel sounds are normal. There is no distension.      Palpations: Abdomen is soft. There is no mass.      Tenderness: There is no abdominal tenderness.      Hernia: No hernia is present.   Genitourinary:     Comments: No lesions noted  Musculoskeletal:         General: No tenderness. Normal range of motion.      Cervical back: Normal range of motion and neck supple.   Lymphadenopathy:      Cervical: No cervical adenopathy.   Skin:     General: Skin is warm and dry.      Findings: No rash.   Neurological:      Mental Status: He is alert and oriented to person, place, and time.      Cranial Nerves: No cranial nerve deficit.   Psychiatric:         Thought Content: Thought content normal.       Admission on 04/26/2022, Discharged on 04/26/2022   Component Date Value Ref Range Status   • Case Report 04/26/2022    Final                    Value:Surgical Pathology Report                         Case: [id number]                                  Authorizing Provider:  Holly Girard MD      Collected:           04/26/2022 01:46 PM          Ordering Location:     [facility]   Received:            04/27/2022 07:41 AM                                 [place] [facility]                                                     Pathologist:           Shashi Asencio MD                                                           Specimens:   1) - Gastric, Antrum                                                                                2) - Small Intestine, Duodenum                                                                      3) - Esophagus, EGJ                                                                       • Final Diagnosis 04/26/2022    Final                    Value:This result contains rich text formatting which cannot be displayed here.     Assessment &  Plan      1. Hemorrhage of anus and rectum    1.  Epigastric pain with nausea and vomiting, improving.  Could be due to marijuana induced functional abdominal pain and nausea.  CT abdomen pelvis was unremarkable.    Continue Prilosec PO daily.    2.  Constipation, abdominal pain with rectal bleeding likely due to IBS and need to rule out IBD.  Add MiraLAX 17 g daily.  Proceed with colonoscopy for further evaluation.  2.  Obesity, recommend exercise and diet control.  4.  Tobacco and marijuana usage, recommend cessation.      Orders placed during this encounter include:  Orders Placed This Encounter   Procedures   • Follow Anesthesia Guidelines / Standing Orders     Standing Status:   Future   • Obtain Informed Consent     Standing Status:   Future     Order Specific Question:   Informed Consent Given For     Answer:   colonoscopy       COLONOSCOPY (N/A)    Review and/or summary of lab tests, radiology, procedures, medications. Review and summary of old records and obtaining of history. The risks and benefits of my recommendations, as well as other treatment options were discussed with the patient today. Questions were answered.    New Medications Ordered This Visit   Medications   • polyethylene glycol (GoLYTELY) 236 g solution     Sig: Please use the instructions given in office     Dispense:  4000 mL     Refill:  0       Follow-up: No follow-ups on file.               Results for orders placed or performed during the hospital encounter of 04/26/22   Tissue Pathology Exam    Specimen: A: Gastric, Antrum; Tissue    B: Small Intestine, Duodenum; Tissue    C: Esophagus; Tissue   Result Value Ref Range    Case Report       Surgical Pathology Report                         Case: [id number]                                  Authorizing Provider:  Holly Girard MD      Collected:           04/26/2022 01:46 PM          Ordering Location:     [facility]   Received:            04/27/2022 07:41 AM                                  [place] [facility]                                                     Pathologist:           Shashi Asencio MD                                                           Specimens:   1) - Gastric, Antrum                                                                                2) - Small Intestine, Duodenum                                                                      3) - Esophagus, EGJ                                                                        Final Diagnosis       SEE SCANNED REPORT       Results for orders placed or performed in visit on 04/25/22   COVID-19, [facility] IN-HOUSE, NP SWAB IN TRANSPORT MEDIA 8-10 HR TAT - Swab, Nasopharynx    Specimen: Nasopharynx; Swab   Result Value Ref Range    COVID19 Not Detected Not Detected - Ref. Range   Results for orders placed or performed during the hospital encounter of 04/21/22   Gold Top - SST   Result Value Ref Range    Extra Tube Hold for add-ons.    Urinalysis, Microscopic Only - Urine, Clean Catch    Specimen: Urine, Clean Catch   Result Value Ref Range    RBC, UA 3-5 (A) None Seen /HPF    WBC, UA 0-2 None Seen, 0-2, 3-5 /HPF    Bacteria, UA None Seen None Seen /HPF    Squamous Epithelial Cells, UA 0-2 None Seen, 0-2 /HPF    Hyaline Casts, UA 0-2 None Seen /LPF    Methodology Automated Microscopy    Urinalysis With Microscopic If Indicated (No Culture) - Urine, Clean Catch    Specimen: Urine, Clean Catch   Result Value Ref Range    Color, UA Dark Yellow Yellow, Straw, Dark Yellow, Anila    Appearance, UA Clear Clear    pH, UA 6.5 5.0 - 9.0    Specific Gravity, UA 1.030 1.003 - 1.030    Glucose, UA Negative Negative    Ketones, UA >=160 mg/dL (4+) (A) Negative    Bilirubin, UA Negative Negative    Blood, UA Trace (A) Negative    Protein, UA 30 mg/dL (1+) (A) Negative    Leuk Esterase, UA Trace (A) Negative    Nitrite, UA Negative Negative    Urobilinogen, UA 2.0 E.U./dL (A) 0.2 - 1.0 E.U./dL   CBC Auto Differential     Specimen: Blood   Result Value Ref Range    WBC 12.49 (H) 3.40 - 10.80 10*3/mm3    RBC 4.17 4.14 - 5.80 10*6/mm3    Hemoglobin 13.2 13.0 - 17.7 g/dL    Hematocrit 37.3 (L) 37.5 - 51.0 %    MCV 89.4 79.0 - 97.0 fL    MCH 31.7 26.6 - 33.0 pg    MCHC 35.4 31.5 - 35.7 g/dL    RDW 13.4 12.3 - 15.4 %    RDW-SD 43.8 37.0 - 54.0 fl    MPV 9.8 6.0 - 12.0 fL    Platelets 330 140 - 450 10*3/mm3    Neutrophil % 68.6 42.7 - 76.0 %    Lymphocyte % 24.3 19.6 - 45.3 %    Monocyte % 6.0 5.0 - 12.0 %    Eosinophil % 0.1 (L) 0.3 - 6.2 %    Basophil % 0.4 0.0 - 1.5 %    Immature Grans % 0.6 (H) 0.0 - 0.5 %    Neutrophils, Absolute 8.58 (H) 1.70 - 7.00 10*3/mm3    Lymphocytes, Absolute 3.03 0.70 - 3.10 10*3/mm3    Monocytes, Absolute 0.75 0.10 - 0.90 10*3/mm3    Eosinophils, Absolute 0.01 0.00 - 0.40 10*3/mm3    Basophils, Absolute 0.05 0.00 - 0.20 10*3/mm3    Immature Grans, Absolute 0.07 (H) 0.00 - 0.05 10*3/mm3    nRBC 0.0 0.0 - 0.2 /100 WBC   Lipase    Specimen: Blood   Result Value Ref Range    Lipase 19 13 - 60 U/L   Amylase    Specimen: Blood   Result Value Ref Range    Amylase 54 28 - 100 U/L   Comprehensive Metabolic Panel    Specimen: Blood   Result Value Ref Range    Glucose 87 65 - 99 mg/dL    BUN 17 6 - 20 mg/dL    Creatinine 0.97 0.76 - 1.27 mg/dL    Sodium 133 (L) 136 - 145 mmol/L    Potassium 3.7 3.5 - 5.2 mmol/L    Chloride 99 98 - 107 mmol/L    CO2 22.0 22.0 - 29.0 mmol/L    Calcium 9.1 8.6 - 10.5 mg/dL    Total Protein 7.2 6.0 - 8.5 g/dL    Albumin 4.20 3.50 - 5.20 g/dL    ALT (SGPT) 14 1 - 41 U/L    AST (SGOT) 15 1 - 40 U/L    Alkaline Phosphatase 77 39 - 117 U/L    Total Bilirubin 0.5 0.0 - 1.2 mg/dL    Globulin 3.0 gm/dL    A/G Ratio 1.4 g/dL    BUN/Creatinine Ratio 17.5 7.0 - 25.0    Anion Gap 12.0 5.0 - 15.0 mmol/L    eGFR 101.2 >60.0 mL/min/1.73   Results for orders placed or performed in visit on 10/30/02   Converted Surgical Pathology    Specimen: Tissue   Result Value Ref Range    Spec Descr 1 SPECIMEN(S):  A LESION     Gross Description         GROSS DESCRIPTION:  The specimen consists of a tan skin fragment measuring 0.2 cm in  diameter each.  Totally submitted.  A fragment of clear suture is also  present in the container.  This has some brown fibrin or tissue  attached.      Final Diagnosis         FINAL DIAGNOSIS:  SUBCUTIS, LEFT THIRD FINGER:            FOREIGN BODY GRANULOMA CONTAINING POLARIZABLE FOREIGN                 MATERIAL.           pgl    DIAGNOSIS CODE:  3      Comment   CLINICAL DIAGNOSIS:  Lesion, left 3rd finger       CONVERTED (HISTORICAL) FINAL PATHOLOGIST       Diagnostician:  KADI MATTSON M.D.  Pathologist  Electronically Signed 11/01/2002           This document has been electronically signed by Holly iGrard MD on May 27, 2022 22:01 CDT

## 2022-06-01 LAB — REF LAB TEST METHOD: NORMAL

## 2022-06-16 ENCOUNTER — OFFICE VISIT (OUTPATIENT)
Dept: GASTROENTEROLOGY | Facility: CLINIC | Age: 42
End: 2022-06-16

## 2022-06-16 VITALS
HEIGHT: 68 IN | SYSTOLIC BLOOD PRESSURE: 141 MMHG | HEART RATE: 65 BPM | BODY MASS INDEX: 31.67 KG/M2 | WEIGHT: 209 LBS | DIASTOLIC BLOOD PRESSURE: 93 MMHG

## 2022-06-16 DIAGNOSIS — K62.5 HEMORRHAGE OF ANUS AND RECTUM: Primary | ICD-10-CM

## 2022-06-16 PROCEDURE — 99213 OFFICE O/P EST LOW 20 MIN: CPT | Performed by: INTERNAL MEDICINE

## 2022-07-05 NOTE — PROGRESS NOTES
Chief Complaint   Patient presents with   • endo f/u        Subjective    Aron Grier is a 42 y.o. male.    History of Present Illness  Patient presented to GI clinic for follow-up visit today.  Feels better currently.  Rectal bleeding has improved.  Heartburn is well controlled.  Bowel movements are regular.  Weight is stable.  Colonoscopy was consistent with adenomatous colon polyp and hemorrhoids.       The following portions of the patient's history were reviewed and updated as appropriate:   History reviewed. No pertinent past medical history.  Past Surgical History:   Procedure Laterality Date   • COLONOSCOPY N/A 5/26/2022    Procedure: COLONOSCOPY;  Surgeon: Holly Girard MD;  Location: [facility] ENDOSCOPY;  Service: Gastroenterology;  Laterality: N/A;   • ENDOSCOPY  04/26/2022    Dr. Girard   • ENDOSCOPY N/A 4/26/2022    Procedure: ESOPHAGOGASTRODUODENOSCOPY;  Surgeon: Holly Girard MD;  Location: [facility] ENDOSCOPY;  Service: Gastroenterology;  Laterality: N/A;   • TENDON REPAIR      left ring finger.   done under local     Family History   Problem Relation Age of Onset   • Heart disease Mother    • Kidney disease Mother        Prior to Admission medications    Medication Sig Start Date End Date Taking? Authorizing Provider   omeprazole (priLOSEC) 40 MG capsule Take 1 capsule by mouth Daily. 5/6/22  Yes Holly Girard MD   ondansetron ODT (ZOFRAN-ODT) 4 MG disintegrating tablet Place 4 mg under the tongue Every 8 (Eight) Hours As Needed. 4/21/22  Yes ProviderArun MD     Allergies   Allergen Reactions   • Penicillins Seizure     Social History     Socioeconomic History   • Marital status: Legally    Tobacco Use   • Smoking status: Current Every Day Smoker     Packs/day: 0.50   • Smokeless tobacco: Never Used   Vaping Use   • Vaping Use: Former   Substance and Sexual Activity   • Alcohol use: Yes     Comment: socially   • Drug use: Yes     Types: Marijuana     Comment: Quit  "in 2022   • Sexual activity: Defer       Review of Systems  Review of Systems   Constitutional: Negative for chills, fatigue, fever and unexpected weight change.   HENT: Negative for congestion, ear discharge, hearing loss, nosebleeds and sore throat.    Eyes: Negative for pain, discharge and redness.   Respiratory: Negative for cough, chest tightness, shortness of breath and wheezing.    Cardiovascular: Negative for chest pain and palpitations.   Gastrointestinal: Positive for blood in stool. Negative for abdominal distention, abdominal pain, constipation, diarrhea, nausea and vomiting.   Endocrine: Negative for cold intolerance, polydipsia, polyphagia and polyuria.   Genitourinary: Negative for dysuria, flank pain, frequency, hematuria and urgency.   Musculoskeletal: Negative for arthralgias, back pain, joint swelling and myalgias.   Skin: Negative for color change, pallor and rash.   Neurological: Negative for tremors, seizures, syncope, weakness and headaches.   Hematological: Negative for adenopathy. Does not bruise/bleed easily.   Psychiatric/Behavioral: Negative for behavioral problems, confusion, dysphoric mood, hallucinations and suicidal ideas. The patient is not nervous/anxious.         /93 (BP Location: Right arm)   Pulse 65   Ht 172.7 cm (68\")   Wt 94.8 kg (209 lb)   BMI 31.78 kg/m²     Objective    Physical Exam  Admission on 2022, Discharged on 2022   Component Date Value Ref Range Status   • Reference Lab Report 2022    Final                    Value:[facility]  [address] [place] [address]  Phone: [phone] or [phone]  Fax: [phone]  Gerald Ny M.D., Medical Director    PATIENT NAME                           LABORATORY NO.  [address]  REBA RODRIGUEZ.                  [id number]  [id number]                         AGE              SEX  SSN           CLIENT REF #  [facility]           42      " "1980      [id number]   [id number]    Gary                       REQUESTING M.D.     ATTENDING M.D.     COPY TO.  [address] [place] [address],  [place] [address]             HUE  DATE COLLECTED      DATE RECEIVED      DATE REPORTED  05/26/2022 05/27/2022 06/01/2022    DIAGNOSIS:  SPLENIC FLEXURE POLYP:  Tubular adenoma without high grade dysplasia    CLINICAL HISTORY:  Hemorrhage of anus and rectum    SPECIMENS RECEIVED:  SPLENIC FLEXURE POLYP    MICROSCOPIC DESCRIPTION:  Sections show mucosa with adenomatous glands                           lined by cells with oval  elongated nuclei and moderate amounts of amphophilic cytoplasm. Scattered  chronic inflammatory cells are present in the lamina propria. There is no  evidence of high grade dysplasia or carcinoma.    Professional interpretation rendered by Josh Lombardo M.D., ALYSSACCaliAGABY. at [facility], [address] [place] [address].    GROSS DESCRIPTION:  Specimen is received in 1 formalin filled container labeled \"splenic flexure  polyp\" and consists of 2 portions of tan soft tissue measuring 1.0 x 0.6 x 0.2 cm.  Specimen is submitted entirely in 1 cassette.  BW    REVIEWED, DIAGNOSED AND ELECTRONICALLY  SIGNED BY:    Josh Lombardo M.D., F.C.A.P.  CPT CODES:  65223       Assessment & Plan    No diagnosis found..   1.  Epigastric pain with nausea and vomiting,  resolved.  Could be due to marijuana induced functional abdominal pain and nausea.  CT abdomen pelvis was unremarkable.    Continue Prilosec PO daily and Zofran as needed.    2.  Constipation, abdominal pain with rectal bleeding likely due to IBS and hemorrhoids.  Continue MiraLAX 17 g daily.   3.  Obesity, recommend exercise and diet control.  4.  Colon polyps, repeat colonoscopy in 3 years.  5.  Tobacco and marijuana usage, recommend cessation.    Orders placed during this encounter include:  No orders of the defined types were placed in this " encounter.      * Surgery not found *    Review and/or summary of lab tests, radiology, procedures, medications. Review and summary of old records and obtaining of history. The risks and benefits of my recommendations, as well as other treatment options were discussed with the patient today. Questions were answered.    No orders of the defined types were placed in this encounter.      Follow-up: Return in about 1 year (around 2023).               Results for orders placed or performed during the hospital encounter of 22   TISSUE EXAM, [facility] ([facility])    Specimen: Large Intestine, Splenic Flexure; Polyp   Result Value Ref Range    Reference Lab Report       [facility]  [address] [place] [address]  Phone: [phone] or [phone]  Fax: [phone]  Gerald Ny M.D., Medical Director    PATIENT NAME                           LABORATORY NO.  1800  REBA RODRIGUEZ.                  [id number]  [id number]                         AGE              SEX  N           CLIENT REF #  [facility]           42      1980      [id number]   [id number]    Luther                       REQUESTING M.D.     ATTENDING M.D.     COPY TO.  [address] [place] [address]  DATE COLLECTED      DATE RECEIVED      DATE REPORTED  2022    DIAGNOSIS:  SPLENIC FLEXURE POLYP:  Tubular adenoma without high grade dysplasia    CLINICAL HISTORY:  Hemorrhage of anus and rectum    SPECIMENS RECEIVED:  SPLENIC FLEXURE POLYP    MICROSCOPIC DESCRIPTION:  Sections show mucosa with adenomatous glands  lined by cells with oval  elongated nuclei and moderate amounts of amphophilic cytoplasm. Scattered  chronic inflammatory cells are present in the lamina propria. There is no  evidence of high grade dysplasia or carcinoma.    Professional interpretation rendered  "by Josh Lombardo M.D., CLEMENTEAJOSE G at [facility], [employer], [address] [place] [address].    GROSS DESCRIPTION:  Specimen is received in 1 formalin filled container labeled \"splenic flexure  polyp\" and consists of 2 portions of tan soft tissue measuring 1.0 x 0.6 x 0.2 cm.  Specimen is submitted entirely in 1 cassette.  BW    REVIEWED, DIAGNOSED AND ELECTRONICALLY  SIGNED BY:    Josh Lombardo M.D., MIKEY.C.A.P.  CPT CODES:  71853     Results for orders placed or performed during the hospital encounter of 04/26/22   Tissue Pathology Exam    Specimen: A: Gastric, Antrum; Tissue    B: Small Intestine, Duodenum; Tissue    C: Esophagus; Tissue   Result Value Ref Range    Case Report       Surgical Pathology Report                         Case: [id number]                                  Authorizing Provider:  Holly Girard MD      Collected:           04/26/2022 01:46 PM          Ordering Location:     [facility]   Received:            04/27/2022 07:41 AM                                 [facility]                                                     Pathologist:           Shashi Asencio MD                                                           Specimens:   1) - Gastric, Antrum                                                                                2) - Small Intestine, Duodenum                                                                      3) - Esophagus, EGJ                                                                        Final Diagnosis       SEE SCANNED REPORT       Results for orders placed or performed in visit on 04/25/22   COVID-19, [facility] IN-HOUSE, NP SWAB IN TRANSPORT MEDIA 8-10 HR TAT - Swab, Nasopharynx    Specimen: Nasopharynx; Swab   Result Value Ref Range    COVID19 Not Detected Not Detected - Ref. Range   Results for orders placed or performed during the hospital encounter of 04/21/22   [facility]   Result Value Ref Range    Extra Tube Hold for " add-ons.    Urinalysis, Microscopic Only - Urine, Clean Catch    Specimen: Urine, Clean Catch   Result Value Ref Range    RBC, UA 3-5 (A) None Seen /HPF    WBC, UA 0-2 None Seen, 0-2, 3-5 /HPF    Bacteria, UA None Seen None Seen /HPF    Squamous Epithelial Cells, UA 0-2 None Seen, 0-2 /HPF    Hyaline Casts, UA 0-2 None Seen /LPF    Methodology Automated Microscopy    Urinalysis With Microscopic If Indicated (No Culture) - Urine, Clean Catch    Specimen: Urine, Clean Catch   Result Value Ref Range    Color, UA Dark Yellow Yellow, Straw, Dark Yellow, Anila    Appearance, UA Clear Clear    pH, UA 6.5 5.0 - 9.0    Specific Gravity, UA 1.030 1.003 - 1.030    Glucose, UA Negative Negative    Ketones, UA >=160 mg/dL (4+) (A) Negative    Bilirubin, UA Negative Negative    Blood, UA Trace (A) Negative    Protein, UA 30 mg/dL (1+) (A) Negative    Leuk Esterase, UA Trace (A) Negative    Nitrite, UA Negative Negative    Urobilinogen, UA 2.0 E.U./dL (A) 0.2 - 1.0 E.U./dL   CBC Auto Differential    Specimen: Blood   Result Value Ref Range    WBC 12.49 (H) 3.40 - 10.80 10*3/mm3    RBC 4.17 4.14 - 5.80 10*6/mm3    Hemoglobin 13.2 13.0 - 17.7 g/dL    Hematocrit 37.3 (L) 37.5 - 51.0 %    MCV 89.4 79.0 - 97.0 fL    MCH 31.7 26.6 - 33.0 pg    MCHC 35.4 31.5 - 35.7 g/dL    RDW 13.4 12.3 - 15.4 %    RDW-SD 43.8 37.0 - 54.0 fl    MPV 9.8 6.0 - 12.0 fL    Platelets 330 140 - 450 10*3/mm3    Neutrophil % 68.6 42.7 - 76.0 %    Lymphocyte % 24.3 19.6 - 45.3 %    Monocyte % 6.0 5.0 - 12.0 %    Eosinophil % 0.1 (L) 0.3 - 6.2 %    Basophil % 0.4 0.0 - 1.5 %    Immature Grans % 0.6 (H) 0.0 - 0.5 %    Neutrophils, Absolute 8.58 (H) 1.70 - 7.00 10*3/mm3    Lymphocytes, Absolute 3.03 0.70 - 3.10 10*3/mm3    Monocytes, Absolute 0.75 0.10 - 0.90 10*3/mm3    Eosinophils, Absolute 0.01 0.00 - 0.40 10*3/mm3    Basophils, Absolute 0.05 0.00 - 0.20 10*3/mm3    Immature Grans, Absolute 0.07 (H) 0.00 - 0.05 10*3/mm3    nRBC 0.0 0.0 - 0.2 /100 WBC   Lipase     Specimen: Blood   Result Value Ref Range    Lipase 19 13 - 60 U/L   Amylase    Specimen: Blood   Result Value Ref Range    Amylase 54 28 - 100 U/L   Comprehensive Metabolic Panel    Specimen: Blood   Result Value Ref Range    Glucose 87 65 - 99 mg/dL    BUN 17 6 - 20 mg/dL    Creatinine 0.97 0.76 - 1.27 mg/dL    Sodium 133 (L) 136 - 145 mmol/L    Potassium 3.7 3.5 - 5.2 mmol/L    Chloride 99 98 - 107 mmol/L    CO2 22.0 22.0 - 29.0 mmol/L    Calcium 9.1 8.6 - 10.5 mg/dL    Total Protein 7.2 6.0 - 8.5 g/dL    Albumin 4.20 3.50 - 5.20 g/dL    ALT (SGPT) 14 1 - 41 U/L    AST (SGOT) 15 1 - 40 U/L    Alkaline Phosphatase 77 39 - 117 U/L    Total Bilirubin 0.5 0.0 - 1.2 mg/dL    Globulin 3.0 gm/dL    A/G Ratio 1.4 g/dL    BUN/Creatinine Ratio 17.5 7.0 - 25.0    Anion Gap 12.0 5.0 - 15.0 mmol/L    eGFR 101.2 >60.0 mL/min/1.73   Results for orders placed or performed in visit on 10/30/02   Converted Surgical Pathology    Specimen: Tissue   Result Value Ref Range    Spec Descr 1 SPECIMEN(S): A LESION     Gross Description         GROSS DESCRIPTION:  The specimen consists of a tan skin fragment measuring 0.2 cm in  diameter each.  Totally submitted.  A fragment of clear suture is also  present in the container.  This has some brown fibrin or tissue  attached.      Final Diagnosis         FINAL DIAGNOSIS:  SUBCUTIS, LEFT THIRD FINGER:            FOREIGN BODY GRANULOMA CONTAINING POLARIZABLE FOREIGN                 MATERIAL.           pgl    DIAGNOSIS CODE:  3      Comment   CLINICAL DIAGNOSIS:  Lesion, left 3rd finger       CONVERTED (HISTORICAL) FINAL PATHOLOGIST       Diagnostician:  KADI MATTSON M.D.  Pathologist  Electronically Signed 11/01/2002           This document has been electronically signed by Holly Girard MD on July 5, 2022 07:31 CDT

## 2023-07-25 ENCOUNTER — OFFICE VISIT (OUTPATIENT)
Dept: GASTROENTEROLOGY | Facility: CLINIC | Age: 43
End: 2023-07-25
Payer: MEDICAID

## 2023-07-25 VITALS
HEART RATE: 67 BPM | OXYGEN SATURATION: 98 % | BODY MASS INDEX: 32.89 KG/M2 | WEIGHT: 217 LBS | SYSTOLIC BLOOD PRESSURE: 130 MMHG | DIASTOLIC BLOOD PRESSURE: 86 MMHG | HEIGHT: 68 IN

## 2023-07-25 DIAGNOSIS — R10.31 RIGHT LOWER QUADRANT ABDOMINAL PAIN: Primary | ICD-10-CM

## 2023-07-25 PROCEDURE — 1159F MED LIST DOCD IN RCRD: CPT | Performed by: INTERNAL MEDICINE

## 2023-07-25 PROCEDURE — 99214 OFFICE O/P EST MOD 30 MIN: CPT | Performed by: INTERNAL MEDICINE

## 2023-07-25 PROCEDURE — 1160F RVW MEDS BY RX/DR IN RCRD: CPT | Performed by: INTERNAL MEDICINE

## 2023-07-25 RX ORDER — CLINDAMYCIN HYDROCHLORIDE 300 MG/1
CAPSULE ORAL
COMMUNITY
Start: 2023-07-21 | End: 2023-07-30

## 2023-07-25 RX ORDER — IBUPROFEN 600 MG/1
TABLET ORAL
COMMUNITY
Start: 2023-07-21 | End: 2023-07-30

## 2023-07-25 RX ORDER — DICYCLOMINE HCL 20 MG
20 TABLET ORAL EVERY 6 HOURS
Qty: 120 TABLET | Refills: 5 | Status: SHIPPED | OUTPATIENT
Start: 2023-07-25 | End: 2023-08-24

## 2023-08-15 NOTE — PROGRESS NOTES
Chief Complaint   Patient presents with    Follow-up     1 year    Abdominal Pain    Nausea    Rectal Bleeding       Subjective    Aron Grier is a 43 y.o. male.    History of Present Illness  Patient presented to GI clinic complaining of intermittent symptoms of right lower quadrant pain for past 6 months.  Denied nausea or vomiting.  Bowel movements regular.  Weight is stable.  CT abdomen pelvis was unremarkable.       The following portions of the patient's history were reviewed and updated as appropriate:   No past medical history on file.  Past Surgical History:   Procedure Laterality Date    COLONOSCOPY N/A 5/26/2022    Procedure: COLONOSCOPY;  Surgeon: Holly Girard MD;  Location: [facility] ENDOSCOPY;  Service: Gastroenterology;  Laterality: N/A;    ENDOSCOPY  04/26/2022    Dr. Girard    ENDOSCOPY N/A 4/26/2022    Procedure: ESOPHAGOGASTRODUODENOSCOPY;  Surgeon: Holly Girard MD;  Location: [facility] ENDOSCOPY;  Service: Gastroenterology;  Laterality: N/A;    TENDON REPAIR      left ring finger.   done under local     Family History   Problem Relation Age of Onset    Heart disease Mother     Kidney disease Mother        Prior to Admission medications    Medication Sig Start Date End Date Taking? Authorizing Provider   omeprazole (priLOSEC) 40 MG capsule Take 1 capsule by mouth Daily. 5/6/22  Yes Holly Girard MD   ondansetron ODT (ZOFRAN-ODT) 4 MG disintegrating tablet Place 1 tablet under the tongue Every 8 (Eight) Hours As Needed. 4/21/22  Yes ProviderArun MD   dicyclomine (BENTYL) 20 MG tablet Take 1 tablet by mouth Every 6 (Six) Hours for 30 days. 7/25/23 8/24/23  Holly Girard MD     Allergies   Allergen Reactions    Penicillins Seizure     Social History     Socioeconomic History    Marital status: Legally    Tobacco Use    Smoking status: Every Day     Packs/day: 0.50     Types: Cigarettes     Passive exposure: Current    Smokeless tobacco: Never   Vaping Use     "Vaping Use: Former   Substance and Sexual Activity    Alcohol use: Yes     Comment: socially    Drug use: Yes     Types: Marijuana     Comment: Quit in 4/2022    Sexual activity: Defer       Review of Systems  Review of Systems   Constitutional:  Negative for chills, fatigue, fever and unexpected weight change.   HENT:  Negative for congestion, ear discharge, hearing loss, nosebleeds and sore throat.    Eyes:  Negative for pain, discharge and redness.   Respiratory:  Negative for cough, chest tightness, shortness of breath and wheezing.    Cardiovascular:  Negative for chest pain and palpitations.   Gastrointestinal:  Positive for abdominal pain. Negative for abdominal distention, blood in stool, constipation, diarrhea, nausea and vomiting.   Endocrine: Negative for cold intolerance, polydipsia, polyphagia and polyuria.   Genitourinary:  Negative for dysuria, flank pain, frequency, hematuria and urgency.   Musculoskeletal:  Negative for arthralgias, back pain, joint swelling and myalgias.   Skin:  Negative for color change, pallor and rash.   Neurological:  Negative for tremors, seizures, syncope, weakness and headaches.   Hematological:  Negative for adenopathy. Does not bruise/bleed easily.   Psychiatric/Behavioral:  Negative for behavioral problems, confusion, dysphoric mood, hallucinations and suicidal ideas. The patient is not nervous/anxious.       /86 (BP Location: Left arm, Patient Position: Sitting, Cuff Size: Adult)   Pulse 67   Ht 172.7 cm (68\")   Wt 98.4 kg (217 lb)   SpO2 98%   BMI 32.99 kg/mý     Objective    Physical Exam  Constitutional:       Appearance: He is well-developed.   HENT:      Head: Normocephalic and atraumatic.   Eyes:      Conjunctiva/sclera: Conjunctivae normal.      Pupils: Pupils are equal, round, and reactive to light.   Neck:      Thyroid: No thyromegaly.   Cardiovascular:      Rate and Rhythm: Normal rate and regular rhythm.      Heart sounds: Normal heart sounds. No " murmur heard.  Pulmonary:      Effort: Pulmonary effort is normal.      Breath sounds: Normal breath sounds. No wheezing.   Abdominal:      General: Bowel sounds are normal. There is no distension.      Palpations: Abdomen is soft. There is no mass.      Tenderness: There is no abdominal tenderness.      Hernia: No hernia is present.   Genitourinary:     Comments: No lesions noted  Musculoskeletal:         General: No tenderness. Normal range of motion.      Cervical back: Normal range of motion and neck supple.   Lymphadenopathy:      Cervical: No cervical adenopathy.   Skin:     General: Skin is warm and dry.      Findings: No rash.   Neurological:      Mental Status: He is alert and oriented to person, place, and time.      Cranial Nerves: No cranial nerve deficit.   Psychiatric:         Thought Content: Thought content normal.     Admission on 2022, Discharged on 2022   Component Date Value Ref Range Status    Reference Lab Report 2022    Final                    Value:[facility]  [address] [place] [address]  Phone: [phone] or [phone]  Fax: [phone]  Gerald Ny M.D., Medical Director    PATIENT NAME                           LABORATORY NO.  1800  REBA RODRIGUEZ.                  [id number]  [id number]                         AGE              SEX  [facility]           CLIENT REF #  [facility]           42      1980      [id number]   [id number]    Mchenry                       REQUESTING M.D.     ATTENDING M.D.     COPY TO[address]  DATE COLLECTED      DATE RECEIVED      DATE REPORTED  2022    DIAGNOSIS:  SPLENIC FLEXURE POLYP:  Tubular adenoma without high grade dysplasia    CLINICAL HISTORY:  Hemorrhage of anus and rectum    SPECIMENS RECEIVED:  SPLENIC FLEXURE POLYP    MICROSCOPIC  "DESCRIPTION:  Sections show mucosa with adenomatous glands                           lined by cells with oval  elongated nuclei and moderate amounts of amphophilic cytoplasm. Scattered  chronic inflammatory cells are present in the lamina propria. There is no  evidence of high grade dysplasia or carcinoma.    Professional interpretation rendered by Josh Lombardo M.D., [facility] at [facility], [address] [place] [address].    GROSS DESCRIPTION:  Specimen is received in 1 formalin filled container labeled \"splenic flexure  polyp\" and consists of 2 portions of tan soft tissue measuring 1.0 x 0.6 x 0.2 cm.  Specimen is submitted entirely in 1 cassette.  BW    REVIEWED, DIAGNOSED AND ELECTRONICALLY  SIGNED BY:    Josh Lombardo M.D., NATHANAEL.SHERYL.  CPT CODES:  91199       Assessment & Plan      1. Right lower quadrant abdominal pain    1.  Right lower quadrant pain rule out small intestinal pathology, colonic pathology and could also be due to marijuana induced functional abdominal pain.  Obtain small bowel follow-through.  Add Bentyl as needed.  Colonoscopy if continues.  2.  Epigastric pain with nausea and vomiting, resolved.  Continue PPI.  3.  Colon polyps, repeat colonoscopy in 2 years.  4.  Tobacco and marijuana usage, recommend cessation.  5.  Obesity, recommend exercise and diet control.         Orders placed during this encounter include:  Orders Placed This Encounter   Procedures    FL small bowel follow through     Standing Status:   Future     Standing Expiration Date:   7/25/2024     Order Specific Question:   Reason for Exam:     Answer:   RLQ pain     Order Specific Question:   Release to patient     Answer:   Routine Release       * Surgery not found *    Review and/or summary of lab tests, radiology, procedures, medications. Review and summary of old records and obtaining of history. The risks and benefits of my recommendations, as well as other treatment options were discussed with the patient " today. Questions were answered.    New Medications Ordered This Visit   Medications    dicyclomine (BENTYL) 20 MG tablet     Sig: Take 1 tablet by mouth Every 6 (Six) Hours for 30 days.     Dispense:  120 tablet     Refill:  5       Follow-up: Return in about 1 month (around 2023).               Results for orders placed or performed during the hospital encounter of 22   TISSUE EXAM, [facility])    Specimen: Large Intestine, Splenic Flexure; Polyp   Result Value Ref Range    Reference Lab Report       [facility]  [address] [place] [address]  Phone: [phone] or [phone]  Fax: [phone]  Gerald Ny M.D., Medical Director    PATIENT NAME                           LABORATORY NO.  1800  REBA RODRIGUEZ.                  [id number]  [id number]                         AGE              SEX  SSN           CLIENT REF #  [facility]           42      1980      [id number]   [id number]    Hackett                       REQUESTING M.D.     ATTENDING M.D.     COPY TO[address]  DATE COLLECTED      DATE RECEIVED      DATE REPORTED  2022    DIAGNOSIS:  SPLENIC FLEXURE POLYP:  Tubular adenoma without high grade dysplasia    CLINICAL HISTORY:  Hemorrhage of anus and rectum    SPECIMENS RECEIVED:  SPLENIC FLEXURE POLYP    MICROSCOPIC DESCRIPTION:  Sections show mucosa with adenomatous glands  lined by cells with oval  elongated nuclei and moderate amounts of amphophilic cytoplasm. Scattered  chronic inflammatory cells are present in the lamina propria. There is no  evidence of high grade dysplasia or carcinoma.    Professional interpretation rendered by Josh Lombardo M.D., F.C.A.P. at [facility], [email or web address], [address] [place] [address].    GROSS DESCRIPTION:  Specimen is received in 1 formalin  "filled container labeled \"splenic flexure  polyp\" and consists of 2 portions of tan soft tissue measuring 1.0 x 0.6 x 0.2 cm.  Specimen is submitted entirely in 1 cassette.  BW    REVIEWED, DIAGNOSED AND ELECTRONICALLY  SIGNED BY:    Josh Lombardo M.D., F.C.A.P.  CPT CODES:  20662     Results for orders placed or performed during the hospital encounter of 04/26/22   Tissue Pathology Exam    Specimen: A: Gastric, Antrum; Tissue    B: Small Intestine, Duodenum; Tissue    C: Esophagus; Tissue   Result Value Ref Range    Case Report       Surgical Pathology Report                         Case: [id number]                                  Authorizing Provider:  Holly Girard MD      Collected:           04/26/2022 01:46 PM          Ordering Location:     [facility]   Received:            04/27/2022 07:41 AM                                 [facility]                                                     Pathologist:           Shashi Asencio MD                                                           Specimens:   1) - Gastric, Antrum                                                                                2) - Small Intestine, Duodenum                                                                      3) - Esophagus, EGJ                                                                        Final Diagnosis       SEE SCANNED REPORT       Results for orders placed or performed in visit on 04/25/22   COVID-19, [facility] IN-HOUSE, NP SWAB IN TRANSPORT MEDIA 8-10 HR TAT - Swab, Nasopharynx    Specimen: Nasopharynx; Swab   Result Value Ref Range    COVID19 Not Detected Not Detected - Ref. Range   Results for orders placed or performed during the hospital encounter of 04/21/22   [facility]   Result Value Ref Range    Extra Tube Hold for add-ons.    Urinalysis, Microscopic Only - Urine, Clean Catch    Specimen: Urine, Clean Catch   Result Value Ref Range    RBC, UA 3-5 (A) None Seen /HPF "    WBC, UA 0-2 None Seen, 0-2, 3-5 /HPF    Bacteria, UA None Seen None Seen /HPF    Squamous Epithelial Cells, UA 0-2 None Seen, 0-2 /HPF    Hyaline Casts, UA 0-2 None Seen /LPF    Methodology Automated Microscopy    Urinalysis With Microscopic If Indicated (No Culture) - Urine, Clean Catch    Specimen: Urine, Clean Catch   Result Value Ref Range    Color, UA Dark Yellow Yellow, Straw, Dark Yellow, Anila    Appearance, UA Clear Clear    pH, UA 6.5 5.0 - 9.0    Specific Gravity, UA 1.030 1.003 - 1.030    Glucose, UA Negative Negative    Ketones, UA >=160 mg/dL (4+) (A) Negative    Bilirubin, UA Negative Negative    Blood, UA Trace (A) Negative    Protein, UA 30 mg/dL (1+) (A) Negative    Leuk Esterase, UA Trace (A) Negative    Nitrite, UA Negative Negative    Urobilinogen, UA 2.0 E.U./dL (A) 0.2 - 1.0 E.U./dL   CBC Auto Differential    Specimen: Blood   Result Value Ref Range    WBC 12.49 (H) 3.40 - 10.80 10*3/mm3    RBC 4.17 4.14 - 5.80 10*6/mm3    Hemoglobin 13.2 13.0 - 17.7 g/dL    Hematocrit 37.3 (L) 37.5 - 51.0 %    MCV 89.4 79.0 - 97.0 fL    MCH 31.7 26.6 - 33.0 pg    MCHC 35.4 31.5 - 35.7 g/dL    RDW 13.4 12.3 - 15.4 %    RDW-SD 43.8 37.0 - 54.0 fl    MPV 9.8 6.0 - 12.0 fL    Platelets 330 140 - 450 10*3/mm3    Neutrophil % 68.6 42.7 - 76.0 %    Lymphocyte % 24.3 19.6 - 45.3 %    Monocyte % 6.0 5.0 - 12.0 %    Eosinophil % 0.1 (L) 0.3 - 6.2 %    Basophil % 0.4 0.0 - 1.5 %    Immature Grans % 0.6 (H) 0.0 - 0.5 %    Neutrophils, Absolute 8.58 (H) 1.70 - 7.00 10*3/mm3    Lymphocytes, Absolute 3.03 0.70 - 3.10 10*3/mm3    Monocytes, Absolute 0.75 0.10 - 0.90 10*3/mm3    Eosinophils, Absolute 0.01 0.00 - 0.40 10*3/mm3    Basophils, Absolute 0.05 0.00 - 0.20 10*3/mm3    Immature Grans, Absolute 0.07 (H) 0.00 - 0.05 10*3/mm3    nRBC 0.0 0.0 - 0.2 /100 WBC   Lipase    Specimen: Blood   Result Value Ref Range    Lipase 19 13 - 60 U/L   Amylase    Specimen: Blood   Result Value Ref Range    Amylase 54 28 - 100 U/L    Comprehensive Metabolic Panel    Specimen: Blood   Result Value Ref Range    Glucose 87 65 - 99 mg/dL    BUN 17 6 - 20 mg/dL    Creatinine 0.97 0.76 - 1.27 mg/dL    Sodium 133 (L) 136 - 145 mmol/L    Potassium 3.7 3.5 - 5.2 mmol/L    Chloride 99 98 - 107 mmol/L    CO2 22.0 22.0 - 29.0 mmol/L    Calcium 9.1 8.6 - 10.5 mg/dL    Total Protein 7.2 6.0 - 8.5 g/dL    Albumin 4.20 3.50 - 5.20 g/dL    ALT (SGPT) 14 1 - 41 U/L    AST (SGOT) 15 1 - 40 U/L    Alkaline Phosphatase 77 39 - 117 U/L    Total Bilirubin 0.5 0.0 - 1.2 mg/dL    Globulin 3.0 gm/dL    A/G Ratio 1.4 g/dL    BUN/Creatinine Ratio 17.5 7.0 - 25.0    Anion Gap 12.0 5.0 - 15.0 mmol/L    eGFR 101.2 >60.0 mL/min/1.73   Results for orders placed or performed in visit on 10/30/02   Converted Surgical Pathology    Specimen: Tissue   Result Value Ref Range    Spec Descr 1 SPECIMEN(S): A LESION     Gross Description         GROSS DESCRIPTION:  The specimen consists of a tan skin fragment measuring 0.2 cm in  diameter each.  Totally submitted.  A fragment of clear suture is also  present in the container.  This has some brown fibrin or tissue  attached.      Final Diagnosis         FINAL DIAGNOSIS:  SUBCUTIS, LEFT THIRD FINGER:            FOREIGN BODY GRANULOMA CONTAINING POLARIZABLE FOREIGN                 MATERIAL.           pgl    DIAGNOSIS CODE:  3      Comment   CLINICAL DIAGNOSIS:  Lesion, left 3rd finger       CONVERTED (HISTORICAL) FINAL PATHOLOGIST       Diagnostician:  KADI MATTSON M.D.  Pathologist  Electronically Signed 11/01/2002           This document has been electronically signed by Holly Girard MD on August 14, 2023 19:39 CDT

## (undated) DEVICE — CANN SMPL SOFTECH BIFLO ETCO2 A/M 7FT

## (undated) DEVICE — BITEBLOCK ENDO W/STRAP 60F A/ LF DISP

## (undated) DEVICE — TRAP SXN POLYP QUICKCATCH LF

## (undated) DEVICE — SINGLE-USE BIOPSY FORCEPS: Brand: RADIAL JAW 4

## (undated) DEVICE — Device: Brand: DISPOSABLE ELECTROSURGICAL SNARE